# Patient Record
Sex: FEMALE | Race: OTHER | HISPANIC OR LATINO
[De-identification: names, ages, dates, MRNs, and addresses within clinical notes are randomized per-mention and may not be internally consistent; named-entity substitution may affect disease eponyms.]

---

## 2019-02-21 ENCOUNTER — RECORD ABSTRACTING (OUTPATIENT)
Age: 49
End: 2019-02-21

## 2019-02-21 DIAGNOSIS — Z83.1 FAMILY HISTORY OF OTHER INFECTIOUS AND PARASITIC DISEASES: ICD-10-CM

## 2019-02-21 DIAGNOSIS — Z78.9 OTHER SPECIFIED HEALTH STATUS: ICD-10-CM

## 2019-02-21 PROBLEM — Z00.00 ENCOUNTER FOR PREVENTIVE HEALTH EXAMINATION: Status: ACTIVE | Noted: 2019-02-21

## 2019-03-05 ENCOUNTER — APPOINTMENT (OUTPATIENT)
Dept: GASTROENTEROLOGY | Facility: CLINIC | Age: 49
End: 2019-03-05
Payer: COMMERCIAL

## 2019-03-05 VITALS
HEIGHT: 62 IN | HEART RATE: 72 BPM | SYSTOLIC BLOOD PRESSURE: 110 MMHG | BODY MASS INDEX: 24.84 KG/M2 | DIASTOLIC BLOOD PRESSURE: 80 MMHG | WEIGHT: 135 LBS

## 2019-03-05 PROCEDURE — 99204 OFFICE O/P NEW MOD 45 MIN: CPT

## 2019-03-05 RX ORDER — CHLORHEXIDINE GLUCONATE 4 %
LIQUID (ML) TOPICAL
Refills: 0 | Status: DISCONTINUED | COMMUNITY
End: 2019-03-05

## 2019-03-05 RX ORDER — OMEPRAZOLE 40 MG/1
40 CAPSULE, DELAYED RELEASE ORAL
Refills: 0 | Status: DISCONTINUED | COMMUNITY
End: 2019-03-05

## 2019-03-05 NOTE — HISTORY OF PRESENT ILLNESS
[FreeTextEntry1] : 48F choly, c-sx x3, presents for seen for vague lower abd pain predominantly L-sided, largely resolving over this time period, associated w/ constipation, bloating, but no fever/ wt loss.  No bleeding.  Sx improve w/ BMs.\par \par Had CT +iv 10/2018 via PMD: prominent soft tissue thickening at ICV  - developoing neoplasm at this level cannot be exlcuded, diverticulsis, R ov cyst. (Saw GYN)\par \par \par Prior eval:\par - epigastric burning '15:\par EGD 8/14/15 hiatal hernia, gastritis\par  \par -Periumbilical pain '15 \par Labs 7/2015 WBC 11 - states txd for UTI at that time. LFTs, celiac panel WNL.\par CT+ normal 10/2015\par colonoscopy 10/2015 one adenoma, diverticulosis - 5y f/u rec\par \par Soc:  + tobacco (advised to quit); no significant EtOH\par FHx: no FHx GI malignancy or IBD\par \par ROS:\par Constitutional:: no weight loss, fevers\par ENT: no deafness\par Eyes: not blind\par Neck: no LN\par Chest: no dyspnea/cough\par Cardiac: no chest pain\par Vascular: no leg swelling\par GI: no abdominal pain, nausea, vomiting, diarrhea, constipation, rectal bleeding, dysphagia, melena unless otherwise noted in HPI\par : no dysuria, dark urine\par Skin: no rashes, jaundice\par Heme: no bleeding\par \par Px: (VS noted below)\par General: NAD\par Eyes: anicteric\par Oropharynx:  clear\par Neck: no LN\par Chest: normal respiratory effort\par CVS: regular\par Abd: soft, NT, ND, +BS, no HSM\par Ext: no atrophy\par Neuro: grossly nonfocal\par

## 2019-03-05 NOTE — ASSESSMENT
[FreeTextEntry1] : 1) abdominal pain - hx functional symtoms, likely similar, improving, but w/ imaging findings, need to do colonoscopy w/ ileal eval.  Fiber supplement pending eval\par \par 2) abnormal imaging - Colonoscopy ASAP

## 2019-03-05 NOTE — CONSULT LETTER
[Dear  ___] : Dear  [unfilled], [Consult Letter:] : I had the pleasure of evaluating your patient, [unfilled]. [Please see my note below.] : Please see my note below. [FreeTextEntry1] : Thank you very much for allowing me to participate in the care of this patient.  If you have any questions, please do not hesitate to contact me.\par \par Sincerely, \par \par Oscar Price MD\par

## 2019-03-25 ENCOUNTER — APPOINTMENT (OUTPATIENT)
Dept: GASTROENTEROLOGY | Facility: HOSPITAL | Age: 49
End: 2019-03-25

## 2019-05-16 ENCOUNTER — APPOINTMENT (OUTPATIENT)
Dept: CARDIOLOGY | Facility: CLINIC | Age: 49
End: 2019-05-16
Payer: COMMERCIAL

## 2019-05-16 VITALS
HEIGHT: 62 IN | BODY MASS INDEX: 25.21 KG/M2 | HEART RATE: 102 BPM | DIASTOLIC BLOOD PRESSURE: 70 MMHG | SYSTOLIC BLOOD PRESSURE: 108 MMHG | WEIGHT: 137 LBS

## 2019-05-16 DIAGNOSIS — E78.5 HYPERLIPIDEMIA, UNSPECIFIED: ICD-10-CM

## 2019-05-16 DIAGNOSIS — K58.9 IRRITABLE BOWEL SYNDROME W/OUT DIARRHEA: ICD-10-CM

## 2019-05-16 DIAGNOSIS — R93.89 ABNORMAL FINDINGS ON DIAGNOSTIC IMAGING OF OTHER SPECIFIED BODY STRUCTURES: ICD-10-CM

## 2019-05-16 DIAGNOSIS — Z83.49 FAMILY HISTORY OF OTHER ENDOCRINE, NUTRITIONAL AND METABOLIC DISEASES: ICD-10-CM

## 2019-05-16 DIAGNOSIS — Z86.19 PERSONAL HISTORY OF OTHER INFECTIOUS AND PARASITIC DISEASES: ICD-10-CM

## 2019-05-16 PROCEDURE — 99244 OFF/OP CNSLTJ NEW/EST MOD 40: CPT | Mod: 25

## 2019-05-16 PROCEDURE — 93000 ELECTROCARDIOGRAM COMPLETE: CPT

## 2019-05-16 PROCEDURE — 99204 OFFICE O/P NEW MOD 45 MIN: CPT

## 2019-05-16 RX ORDER — ACYCLOVIR 800 MG/1
800 TABLET ORAL
Refills: 0 | Status: ACTIVE | COMMUNITY

## 2019-05-16 NOTE — HISTORY OF PRESENT ILLNESS
[FreeTextEntry1] : Ms arenas is referred by Dr Blum for chest pain . She notices a sharp mid sternal chest discomfort sometimes once a week, no radiation with walking, she denies dyspnea. She gets chronic palpitations "once in a while"  a tightness and a rapid heartbeat for a minute or  2. She denies syncope. She smokes 6-8 cigarettes daily and doesn't exercise.

## 2019-06-12 ENCOUNTER — APPOINTMENT (OUTPATIENT)
Dept: CARDIOLOGY | Facility: CLINIC | Age: 49
End: 2019-06-12
Payer: COMMERCIAL

## 2019-06-12 VITALS
SYSTOLIC BLOOD PRESSURE: 96 MMHG | WEIGHT: 138 LBS | HEART RATE: 86 BPM | BODY MASS INDEX: 25.24 KG/M2 | DIASTOLIC BLOOD PRESSURE: 65 MMHG

## 2019-06-12 PROCEDURE — 93015 CV STRESS TEST SUPVJ I&R: CPT

## 2019-06-12 PROCEDURE — 99213 OFFICE O/P EST LOW 20 MIN: CPT | Mod: 25

## 2019-06-12 NOTE — PHYSICAL EXAM
[Normal Appearance] : normal appearance [General Appearance - In No Acute Distress] : no acute distress [Auscultation Breath Sounds / Voice Sounds] : lungs were clear to auscultation bilaterally [Respiration, Rhythm And Depth] : normal respiratory rhythm and effort [Abnormal Walk] : normal gait [Edema] : no peripheral edema present [Heart Sounds] : normal S1 and S2

## 2019-06-12 NOTE — REVIEW OF SYSTEMS
[Recent Weight Loss (___ Lbs)] : no recent weight loss [Recent Weight Gain (___ Lbs)] : no recent weight gain [Shortness Of Breath] : no shortness of breath [Dyspnea on exertion] : not dyspnea during exertion [Chest Pain] : no chest pain [Lower Ext Edema] : no extremity edema [Palpitations] : no palpitations [Cough] : no cough [Easy Bleeding] : no tendency for easy bleeding [Easy Bruising] : no tendency for easy bruising

## 2019-06-12 NOTE — ASSESSMENT
[FreeTextEntry1] : Stress test today shows no evidence of exercise-induced ischemia. Good functional capacity\par \par Regular aerobic exercise discussed\par \par Smoking cessation urged\par \par has echocardiogram scheduled

## 2019-06-14 ENCOUNTER — RESULT REVIEW (OUTPATIENT)
Age: 49
End: 2019-06-14

## 2020-05-20 ENCOUNTER — APPOINTMENT (OUTPATIENT)
Dept: CARDIOLOGY | Facility: CLINIC | Age: 50
End: 2020-05-20
Payer: COMMERCIAL

## 2020-05-20 DIAGNOSIS — Z82.49 FAMILY HISTORY OF ISCHEMIC HEART DISEASE AND OTHER DISEASES OF THE CIRCULATORY SYSTEM: ICD-10-CM

## 2020-05-20 DIAGNOSIS — F17.200 NICOTINE DEPENDENCE, UNSPECIFIED, UNCOMPLICATED: ICD-10-CM

## 2020-05-20 DIAGNOSIS — R00.2 PALPITATIONS: ICD-10-CM

## 2020-05-20 DIAGNOSIS — R94.31 ABNORMAL ELECTROCARDIOGRAM [ECG] [EKG]: ICD-10-CM

## 2020-05-20 DIAGNOSIS — R73.03 PREDIABETES.: ICD-10-CM

## 2020-05-20 DIAGNOSIS — R07.89 OTHER CHEST PAIN: ICD-10-CM

## 2020-05-20 PROCEDURE — 99214 OFFICE O/P EST MOD 30 MIN: CPT | Mod: 95

## 2020-05-20 NOTE — HISTORY OF PRESENT ILLNESS
[Home] : at home, [unfilled] , at the time of the visit. [Other Location: e.g. Home (Enter Location, City,State)___] : at [unfilled] [Verbal consent obtained from patient] : the patient, [unfilled] [FreeTextEntry1] : Ms arenas was referred by Dr Blum in 2019  for chest pain . Her stress and echo were normal.\par \par She has occasional palpitations, especially with anxiety 2-3 times a month, she stops and takes a deep breathe.  She gets chest tightness in the mid and left substernal area, she attributes this to smoking, 1-2x /month, it occurs with movement. She has dyspnea on exertion, no syncope, no PND or orthopnea.\par She continues to  smoke 1/2 ppd.

## 2021-05-27 ENCOUNTER — APPOINTMENT (OUTPATIENT)
Dept: CARDIOLOGY | Facility: CLINIC | Age: 51
End: 2021-05-27

## 2022-05-13 ENCOUNTER — APPOINTMENT (OUTPATIENT)
Dept: PULMONOLOGY | Facility: CLINIC | Age: 52
End: 2022-05-13
Payer: COMMERCIAL

## 2022-05-13 ENCOUNTER — LABORATORY RESULT (OUTPATIENT)
Age: 52
End: 2022-05-13

## 2022-05-13 ENCOUNTER — RESULT REVIEW (OUTPATIENT)
Age: 52
End: 2022-05-13

## 2022-05-13 ENCOUNTER — NON-APPOINTMENT (OUTPATIENT)
Age: 52
End: 2022-05-13

## 2022-05-13 VITALS
WEIGHT: 144 LBS | HEART RATE: 94 BPM | BODY MASS INDEX: 26.5 KG/M2 | OXYGEN SATURATION: 100 % | HEIGHT: 62 IN | DIASTOLIC BLOOD PRESSURE: 60 MMHG | SYSTOLIC BLOOD PRESSURE: 100 MMHG | TEMPERATURE: 96.3 F

## 2022-05-13 LAB — EPWORTH SCORE: 17

## 2022-05-13 PROCEDURE — 99205 OFFICE O/P NEW HI 60 MIN: CPT | Mod: 25

## 2022-05-13 PROCEDURE — 94010 BREATHING CAPACITY TEST: CPT

## 2022-05-13 PROCEDURE — 99407 BEHAV CHNG SMOKING > 10 MIN: CPT

## 2022-05-13 NOTE — REASON FOR VISIT
[Initial] : an initial visit [Sleep Evaluation] : sleep evaluation [Shortness of Breath] : shortness of breath [TextBox_44] : smoking

## 2022-05-13 NOTE — HISTORY OF PRESENT ILLNESS
[Awakes Unrefreshed] : awakes unrefreshed [Awakes with Dry Mouth] : awakes with dry mouth [Awakes with Headache] : awakes with headache [Daytime Somnolence] : daytime somnolence [Difficulty Initiating Sleep] : difficulty initiating sleep [Difficulty Maintaining Sleep] : difficulty maintaining sleep [Fatigue] : fatigue [Frequent Nocturnal Awakening] : frequent nocturnal awakening [Hypersomnolence] : hypersomnolence [Nonrestorative Sleep] : nonrestorative sleep [Paresthesias] : paresthesias [Sleep Paralysis] : sleep paralysis [Snoring] : snoring [Tired while Driving] : tired while driving [Unintentional Sleep while Active] : unintentional sleep while active [Unintentional Sleep while Inactive] : unintentional sleep while inactive [Unusual Movements] : unusual movements [Unusual Sleep Behavior] : unusual sleep behavior [Vivid dreams] : vivid dreams [Lower Extremity Discomfort] : lower extremity discomfort [Irresistible urge to move legs] : irresistible urge to move legs [LE discomfort relieved by movement] : lower extremity discomfort relieved by movement [Late day/ Evening symptoms] : late day/ evening symptoms [Sleep Disturbances due to LE symptoms] : sleep disturbances due to lower extremity symptoms [Severe] : severe [Current] : current [Never] : never [TextBox_4] : I was asked to consult on this pt Dy Dr Jimenez for SOB.\par Patient is a 53 y/o HW born in [place], current 1 PPD smoker x 21 years w/ h/o pre DM,m has tried to quit smoking numerous times in the past but has not yet been successful. She finds smoking cigs to be a daily habit of hers that has been difficult to shake. She c/o bad SOB that renders her unable to go uphill/ stairs or take long walks. She reports a cough that usually happens first thing in the AM but also occurs intermittently throughout the afternoon and night. + sig reflux, nasal sx such as post-nasal drip, stuffy nose and rhinorrhea. Her mucus is currently clear, but when her allergies flare up it turns yellow/green and is accompanied by sinus congestion. She becomes allergic when she goes outdoors in the spring and she also owns 3 cats at home. She reports chest pressure especially when she lies down to the point she has to hold her shirt off her chest to give herself space to breathe. She snores loudly at night and hears complaints from her boyfriend about it. She also c/o leg cramps when she gets into bed at night and has to walk around to get them to subside. She feels like some night she is unable to find a comfortable position for her legs to rest. She describes feeling "like a 89 y/o lady" when she gets up in the morning.  [TextBox_11] : 1 [Cataplexy] : denies cataplexy [Dysesthesias] : denies dysesthesias [Hypnagogic Hallucinations] : denies hypnagogic hallucinations [Hypnopompic Hallucinations] : denies hypnopompic hallucinations [Recent  Weight Gain] : no recent weight gain [Witnessed Apneas] : no witnessed apneas [TextBox_77] : 10-11 [TextBox_79] : 8 [TextBox_81] : 62-30 [TextBox_83] : yes [TextBox_85] : 8 [TextBox_87] : 2 hours [TextBox_89] : 3-4 [ESS] : 17 [TextBox_5] : 6 [TextBox_7] : 36 [TextBox_9] : 0

## 2022-05-13 NOTE — PHYSICAL EXAM
[No Acute Distress] : no acute distress [Normal Appearance] : normal appearance [No Neck Mass] : no neck mass [Normal Rate/Rhythm] : normal rate/rhythm [Normal S1, S2] : normal s1, s2 [No Murmurs] : no murmurs [No Resp Distress] : no resp distress [No Abnormalities] : no abnormalities [Benign] : benign [Normal Gait] : normal gait [No Clubbing] : no clubbing [No Cyanosis] : no cyanosis [No Edema] : no edema [FROM] : FROM [Normal Color/ Pigmentation] : normal color/ pigmentation [No Focal Deficits] : no focal deficits [Oriented x3] : oriented x3 [Normal Affect] : normal affect [Low Lying Soft Palate] : low lying soft palate [IV] : Mallampati Class: IV [TextBox_11] : inflamed boggy nasal turbinates, glossomegaly  [TextBox_68] : globally diminished BS, + inspiratory wheeze R base, scattered rhonchi

## 2022-05-13 NOTE — CONSULT LETTER
[___] : [unfilled] [FreeTextEntry1] : Thank you for allowing me to consult on PRATIK ASHER  for  SOB/ snoring.  Please see my note below.\par \par \par \par  [FreeTextEntry3] : Thank you very much for allowing me to consult on your patient.  If you have any questions, please do not hesitate to contact me.\par \par Sincerely,\par \par Donis Joshua MD\par Pulmonary and Sleep Medicine\par [facility]\par

## 2022-05-13 NOTE — REVIEW OF SYSTEMS
[Fatigue] : fatigue [EDS] : EDS [Sore Throat] : sore throat [Dry Mouth] : dry mouth [Chest Tightness] : chest tightness [Wheezing] : wheezing [A.M. Dry Mouth] : a.m. dry mouth [SOB on Exertion] : sob on exertion [Leg Cramps] : leg cramps [Orthopnea] : orthopnea [GERD] : gerd [Arthralgias] : arthralgias [Negative] : Endocrine

## 2022-05-13 NOTE — DISCUSSION/SUMMARY
[FreeTextEntry1] : [place] 5/13/2022: FEV1 2.53 (99% pred), FEV1/FVC 83 \par CT Abd 4/22/2021: Thickened GE junction. Mild emphysematous pattern seen at the base. \par

## 2022-05-16 LAB
25(OH)D3 SERPL-MCNC: 30.2 NG/ML
ALBUMIN SERPL ELPH-MCNC: 4.6 G/DL
ALP BLD-CCNC: 96 U/L
ALT SERPL-CCNC: 18 U/L
ANION GAP SERPL CALC-SCNC: 11 MMOL/L
AST SERPL-CCNC: 16 U/L
BASOPHILS # BLD AUTO: 0.07 K/UL
BASOPHILS NFR BLD AUTO: 0.7 %
BILIRUB SERPL-MCNC: 0.2 MG/DL
BUN SERPL-MCNC: 12 MG/DL
CALCIUM SERPL-MCNC: 9.4 MG/DL
CHLORIDE SERPL-SCNC: 103 MMOL/L
CO2 SERPL-SCNC: 23 MMOL/L
CREAT SERPL-MCNC: 0.81 MG/DL
CRP SERPL-MCNC: <3 MG/L
EGFR: 88 ML/MIN/1.73M2
EOSINOPHIL # BLD AUTO: 0.2 K/UL
EOSINOPHIL NFR BLD AUTO: 1.9 %
ERYTHROCYTE [SEDIMENTATION RATE] IN BLOOD BY WESTERGREN METHOD: 14 MM/HR
FERRITIN SERPL-MCNC: 235 NG/ML
GLUCOSE SERPL-MCNC: 102 MG/DL
HCT VFR BLD CALC: 45.7 %
HGB BLD-MCNC: 14.2 G/DL
IMM GRANULOCYTES NFR BLD AUTO: 0.4 %
IRON SATN MFR SERPL: 23 %
IRON SERPL-MCNC: 81 UG/DL
LYMPHOCYTES # BLD AUTO: 2.81 K/UL
LYMPHOCYTES NFR BLD AUTO: 26.8 %
MAGNESIUM SERPL-MCNC: 2.1 MG/DL
MAN DIFF?: NORMAL
MCHC RBC-ENTMCNC: 30 PG
MCHC RBC-ENTMCNC: 31.1 GM/DL
MCV RBC AUTO: 96.4 FL
MONOCYTES # BLD AUTO: 0.73 K/UL
MONOCYTES NFR BLD AUTO: 7 %
NEUTROPHILS # BLD AUTO: 6.62 K/UL
NEUTROPHILS NFR BLD AUTO: 63.2 %
PLATELET # BLD AUTO: 334 K/UL
POTASSIUM SERPL-SCNC: 4.8 MMOL/L
PROT SERPL-MCNC: 7.3 G/DL
RBC # BLD: 4.74 M/UL
RBC # FLD: 13.3 %
SODIUM SERPL-SCNC: 138 MMOL/L
TIBC SERPL-MCNC: 347 UG/DL
UIBC SERPL-MCNC: 266 UG/DL
WBC # FLD AUTO: 10.47 K/UL

## 2022-05-20 LAB
A ALTERNATA IGE QN: <0.1 KUA/L
A ALTERNATA IGE QN: <0.1 KUA/L
A FUMIGATUS IGE QN: <0.1 KUA/L
A FUMIGATUS IGE QN: <0.1 KUA/L
BERMUDA GRASS IGE QN: <0.1 KUA/L
BOXELDER IGE QN: <0.1 KUA/L
C HERBARUM IGE QN: <0.1 KUA/L
C HERBARUM IGE QN: <0.1 KUA/L
CALIF WALNUT IGE QN: <0.1 KUA/L
CAT (RFEL D) 1 IGE QN: <0.1 KUA/L
CAT (RFEL D) 4 IGE QN: <0.1 KUA/L
CAT (RFEL D) 7 IGE QN: <0.1 KUA/L
CAT DANDER IGE QN: <0.1 KUA/L
CAT SERUM ALB IGE QN: <0.1 KUA/L
CMN PIGWEED IGE QN: <0.1 KUA/L
COMMON RAGWEED IGE QN: <0.1 KUA/L
COTTONWOOD IGE QN: <0.1 KUA/L
D FARINAE IGE QN: <0.1 KUA/L
D PTERONYSS IGE QN: <0.1 KUA/L
DEPRECATED A ALTERNATA IGE RAST QL: 0
DEPRECATED A ALTERNATA IGE RAST QL: 0
DEPRECATED A FUMIGATUS IGE RAST QL: 0
DEPRECATED A FUMIGATUS IGE RAST QL: 0
DEPRECATED BERMUDA GRASS IGE RAST QL: 0
DEPRECATED BOXELDER IGE RAST QL: 0
DEPRECATED C HERBARUM IGE RAST QL: 0
DEPRECATED C HERBARUM IGE RAST QL: 0
DEPRECATED CAT (RFEL D) 1 IGE RAST QL: 0
DEPRECATED CAT (RFEL D) 4 IGE RAST QL: 0
DEPRECATED CAT (RFEL D) 7 IGE RAST QL: 0
DEPRECATED CAT DANDER IGE RAST QL: 0
DEPRECATED CAT SERUM ALB IGE RAST QL: 0
DEPRECATED COMMON PIGWEED IGE RAST QL: 0
DEPRECATED COMMON RAGWEED IGE RAST QL: 0
DEPRECATED COTTONWOOD IGE RAST QL: 0
DEPRECATED D FARINAE IGE RAST QL: 0
DEPRECATED D PTERONYSS IGE RAST QL: 0
DEPRECATED DOG (RCAN F) 1 IGE RAST QL: 0
DEPRECATED DOG (RCAN F) 2 IGE RAST QL: 0
DEPRECATED DOG (RCAN F) 4 IGE RAST QL: 0
DEPRECATED DOG (RCAN F) 5 IGE RAST QL: 0
DEPRECATED DOG (RCAN F) 6 IGE RAST QL: 0
DEPRECATED DOG DANDER IGE RAST QL: 0
DEPRECATED DOG SERUM ALB IGE RAST QL: 0
DEPRECATED GOOSEFOOT IGE RAST QL: 0
DEPRECATED LONDON PLANE IGE RAST QL: 0
DEPRECATED MOUSE URINE PROT IGE RAST QL: 0
DEPRECATED MUGWORT IGE RAST QL: 0
DEPRECATED P NOTATUM IGE RAST QL: 0
DEPRECATED P NOTATUM IGE RAST QL: 0
DEPRECATED RED CEDAR IGE RAST QL: 0
DEPRECATED ROACH IGE RAST QL: 0
DEPRECATED S ROSTRATA IGE RAST QL: 0
DEPRECATED SHEEP SORREL IGE RAST QL: 0
DEPRECATED SILVER BIRCH IGE RAST QL: 0
DEPRECATED TIMOTHY IGE RAST QL: 0
DEPRECATED WHITE ASH IGE RAST QL: 0
DEPRECATED WHITE OAK IGE RAST QL: 0
DOG (RCAN F) 1 IGE QN: <0.1 KUA/L
DOG (RCAN F) 2 IGE QN: <0.1 KUA/L
DOG (RCAN F) 4 IGE QN: <0.1 KUA/L
DOG (RCAN F) 5 IGE QN: <0.1 KUA/L
DOG (RCAN F) 6 IGE QN: <0.1 KUA/L
DOG DANDER IGE QN: <0.1 KUA/L
DOG SERUM ALB IGE QN: <0.1 KUA/L
GOOSEFOOT IGE QN: <0.1 KUA/L
HORSE (REQU C) 1 IGE QN: 0
HORSE REQU C 1 IGE E227 CONC: <0.1 KUA/L
LONDON PLANE IGE QN: <0.1 KUA/L
MOUSE URINE PROT IGE QN: <0.1 KUA/L
MUGWORT IGE QN: <0.1 KUA/L
MULBERRY (T70) CLASS: 0
MULBERRY (T70) CONC: <0.1 KUA/L
P NOTATUM IGE QN: <0.1 KUA/L
P NOTATUM IGE QN: <0.1 KUA/L
RED CEDAR IGE QN: <0.1 KUA/L
ROACH IGE QN: <0.1 KUA/L
S ROSTRATA IGE QN: <0.1 KUA/L
SHEEP SORREL IGE QN: <0.1 KUA/L
SILVER BIRCH IGE QN: <0.1 KUA/L
TIMOTHY IGE QN: <0.1 KUA/L
TOTAL IGE SMQN RAST: 7 KU/L
TREE ALLERG MIX1 IGE QL: 0
WHITE ASH IGE QN: <0.1 KUA/L
WHITE ELM IGE QN: 0
WHITE ELM IGE QN: <0.1 KUA/L
WHITE OAK IGE QN: <0.1 KUA/L

## 2022-07-13 ENCOUNTER — NON-APPOINTMENT (OUTPATIENT)
Age: 52
End: 2022-07-13

## 2022-07-13 ENCOUNTER — APPOINTMENT (OUTPATIENT)
Dept: PULMONOLOGY | Facility: CLINIC | Age: 52
End: 2022-07-13

## 2022-07-13 VITALS
TEMPERATURE: 95.3 F | HEIGHT: 62 IN | DIASTOLIC BLOOD PRESSURE: 60 MMHG | SYSTOLIC BLOOD PRESSURE: 100 MMHG | BODY MASS INDEX: 26.5 KG/M2 | OXYGEN SATURATION: 97 % | WEIGHT: 144 LBS | HEART RATE: 81 BPM

## 2022-07-13 PROCEDURE — 99214 OFFICE O/P EST MOD 30 MIN: CPT | Mod: 25

## 2022-07-13 PROCEDURE — 99407 BEHAV CHNG SMOKING > 10 MIN: CPT

## 2022-07-13 PROCEDURE — 94010 BREATHING CAPACITY TEST: CPT

## 2022-07-13 RX ORDER — ACYCLOVIR 400 MG/1
400 TABLET ORAL
Qty: 60 | Refills: 0 | Status: ACTIVE | COMMUNITY
Start: 2022-04-06

## 2022-07-13 RX ORDER — VARENICLINE 0.5 MG/1
0.5 TABLET, FILM COATED ORAL
Qty: 11 | Refills: 0 | Status: ACTIVE | COMMUNITY
Start: 2022-05-23

## 2022-07-13 RX ORDER — VARENICLINE 1 MG/1
1 TABLET, FILM COATED ORAL
Qty: 60 | Refills: 0 | Status: ACTIVE | COMMUNITY
Start: 2022-05-23

## 2022-07-14 NOTE — DISCUSSION/SUMMARY
[FreeTextEntry1] : [place] 7/13/2022 FEV1 2.49 (98% predicted) FEV1/FVC- 81\par [facility] 5/22/2022: AHI=4, lowest saturation 86%\par 6MWT 5/18/2022: No sig desat but + increased HR response.\par PFTs 05/18/2022 Actual FEV1 2.29 (FEV1 Pred 89%), FEV1/FVC(%) - 84, RV 85%, TLC 89%, RV/TLC 96%, DLCO 72% \par [place] 5/13/2022: FEV1 2.53 (99% pred), FEV1/FVC 83 \par CXR 5/13/2022: Mild hyperinflation otherwise NAD.\par CT Abd 4/22/2021: Thickened GE junction. Mild emphysematous pattern seen at the base. \par

## 2022-07-14 NOTE — COUNSELING
[Cessation strategies including cessation program discussed] : Cessation strategies including cessation program discussed [Use of nicotine replacement therapies and other medications discussed] : Use of nicotine replacement therapies and other medications discussed [Encouraged to pick a quit date and identify support needed to quit] : Encouraged to pick a quit date and identify support needed to quit [Yes] : Willing to quit smoking [FreeTextEntry3] : 16

## 2022-07-14 NOTE — HISTORY OF PRESENT ILLNESS
[Current] : current [Never] : never [Awakes Unrefreshed] : awakes unrefreshed [Awakes with Dry Mouth] : awakes with dry mouth [Awakes with Headache] : awakes with headache [Daytime Somnolence] : daytime somnolence [Difficulty Initiating Sleep] : difficulty initiating sleep [Difficulty Maintaining Sleep] : difficulty maintaining sleep [Fatigue] : fatigue [Frequent Nocturnal Awakening] : frequent nocturnal awakening [Hypersomnolence] : hypersomnolence [Nonrestorative Sleep] : nonrestorative sleep [Paresthesias] : paresthesias [Sleep Paralysis] : sleep paralysis [Snoring] : snoring [Tired while Driving] : tired while driving [Unintentional Sleep while Active] : unintentional sleep while active [Unintentional Sleep while Inactive] : unintentional sleep while inactive [Unusual Movements] : unusual movements [Unusual Sleep Behavior] : unusual sleep behavior [Vivid dreams] : vivid dreams [Lower Extremity Discomfort] : lower extremity discomfort [Irresistible urge to move legs] : irresistible urge to move legs [LE discomfort relieved by movement] : lower extremity discomfort relieved by movement [Late day/ Evening symptoms] : late day/ evening symptoms [Sleep Disturbances due to LE symptoms] : sleep disturbances due to lower extremity symptoms [Severe] : severe [TextBox_4] : Patient returns on a f/u for asthma w/ COPD. She is doing better w/ the cigarettes and is down to 3-4/day on generic Chantix (varenicline). She reports occasional cough w/ wheeze and chest tightness. She says her nasal passages are good. She reports having barely slept during her HST. She still has issues sleeping and gets tired during the day including while she is driving home. Her weight has been up and down but is currently the same as it was during her last visit in May.\par  [TextBox_11] : 1 [Cataplexy] : denies cataplexy [Dysesthesias] : denies dysesthesias [Hypnagogic Hallucinations] : denies hypnagogic hallucinations [Hypnopompic Hallucinations] : denies hypnopompic hallucinations [Recent  Weight Gain] : no recent weight gain [Witnessed Apneas] : no witnessed apneas [TextBox_77] : 10-11 [TextBox_79] : 8 [TextBox_81] : 28-30 [TextBox_83] : yes [TextBox_85] : 8 [TextBox_87] : 2 hours [TextBox_89] : 3-4 [ESS] : 17 [TextBox_5] : 6 [TextBox_7] : 36 [TextBox_9] : 0

## 2022-07-14 NOTE — PHYSICAL EXAM
[No Acute Distress] : no acute distress [Low Lying Soft Palate] : low lying soft palate [IV] : Mallampati Class: IV [Normal Appearance] : normal appearance [No Neck Mass] : no neck mass [Normal Rate/Rhythm] : normal rate/rhythm [Normal S1, S2] : normal s1, s2 [No Murmurs] : no murmurs [No Resp Distress] : no resp distress [No Abnormalities] : no abnormalities [Normal Gait] : normal gait [No Clubbing] : no clubbing [No Cyanosis] : no cyanosis [No Edema] : no edema [FROM] : FROM [Normal Color/ Pigmentation] : normal color/ pigmentation [No Focal Deficits] : no focal deficits [Oriented x3] : oriented x3 [Normal Affect] : normal affect [Clear to Auscultation Bilaterally] : clear to auscultation bilaterally [TextBox_11] : inflamed nasal mucosa, crowded o/p, glossomegaly, cobblestoning [TextBox_89] : + ventral hernia

## 2022-07-14 NOTE — CONSULT LETTER
[___] : [unfilled] [FreeTextEntry1] : Thank you for allowing me to consult on PRATIK ASHER  for  SOB/ snoring.  Please see my note below.\par \par \par \par  [FreeTextEntry3] : Thank you very much for allowing me to consult on your patient.  If you have any questions, please do not hesitate to contact me.\par \par Sincerely,\par \par Donis Joshua MD\par Pulmonary and Sleep Medicine\par [facility]\par

## 2022-08-10 ENCOUNTER — NON-APPOINTMENT (OUTPATIENT)
Age: 52
End: 2022-08-10

## 2022-08-10 ENCOUNTER — APPOINTMENT (OUTPATIENT)
Dept: SURGERY | Facility: CLINIC | Age: 52
End: 2022-08-10

## 2022-08-10 VITALS
HEIGHT: 62 IN | HEART RATE: 76 BPM | DIASTOLIC BLOOD PRESSURE: 79 MMHG | OXYGEN SATURATION: 97 % | WEIGHT: 145.6 LBS | BODY MASS INDEX: 26.79 KG/M2 | SYSTOLIC BLOOD PRESSURE: 116 MMHG

## 2022-08-10 DIAGNOSIS — K43.0 INCISIONAL HERNIA WITH OBSTRUCTION, W/OUT GANGRENE: ICD-10-CM

## 2022-08-10 PROCEDURE — 99213 OFFICE O/P EST LOW 20 MIN: CPT

## 2022-08-10 RX ORDER — FAMOTIDINE 20 MG/1
20 TABLET, FILM COATED ORAL
Qty: 20 | Refills: 0 | Status: ACTIVE | COMMUNITY
Start: 2022-08-05

## 2022-08-10 NOTE — PLAN
[FreeTextEntry1] : Is a 52-year-old female who is here for follow-up 2 years after a component separation hernia repair done with a 15 cm ProGrip mesh as well as phasic's.  Her hernia was from a supraumbilical cholecystectomy site done elsewhere.  2 months ago she developed discomfort above her umbilicus and she had a CAT scan done by her primary care physician showing a hernia with a 3 cm defect approximately 4 inches above the umbilicus.  She sent here for evaluation.\par \par On exam she has a hernia located approximately 2 to 3 cm above the previous scar.  It is partially reducible.  It is appreciated in the standing and supine position.\par \par Plan: At the original surgery the patient had 3 hernias noted that were combined into 1.  Either she has a recurrence of one of the upper defects or perhaps there was a defect that was missed at the time of the original surgery above the current incision.  I have offered the patient repair again with mesh.  She states that this summer is not a good time for her and that she may come back in October currently the hernia is containing incarcerated omentum.  I explained to her the chance of worsening symptoms and less likely incarceration with bowel.  She understands and when she is ready for surgical repair she will return for scheduled.  She discussed the possibility of plastic surgery at the time of this surgery but likely does not wish to pursue that option.

## 2022-09-06 ENCOUNTER — APPOINTMENT (OUTPATIENT)
Dept: SURGERY | Facility: CLINIC | Age: 52
End: 2022-09-06

## 2022-09-06 VITALS
HEART RATE: 84 BPM | TEMPERATURE: 98.4 F | OXYGEN SATURATION: 99 % | SYSTOLIC BLOOD PRESSURE: 126 MMHG | DIASTOLIC BLOOD PRESSURE: 78 MMHG

## 2022-09-06 PROCEDURE — 99213 OFFICE O/P EST LOW 20 MIN: CPT

## 2022-09-06 NOTE — ASSESSMENT
[FreeTextEntry1] : Patient presents for second opinion regarding her abdominal wall hernia.  She is status post open repair of complex chronically incarcerated ventral hernia x3 2 years ago.  She states she notices protrusion in her upper abdomen and subsequently saw the primary surgeon.  A CAT scan was ordered by her primary care physician and the CT demonstrated an increase in the size of the fat-containing anterior midline abdominal wall hernia that is 2-1/2 cm above the umbilicus and the defect is 3 cm in width and the sac is over 5 cm.  Patient now wish to have this hernia repaired.\par \par He has no complaints of nausea or vomiting no change in bowel habits.\par \par Physical examination patient examined erect and supine position.  The abdomen is slightly obese it is soft nontender and nondistended.  She has a mismatch abdominal wall contour she is slightly larger on the left lower quadrant than that of the right lower quadrant.  She has a slight protrusion in the upper aspect of the scar.  On examination there is no obvious defect appreciated on exam.  Findings on exam today are consistent with that of a diastases or a small hernia.  There is no discrete mass.  She has a mild upper abdominal wall diastases from xiphoid to umbilicus.\par \par Impression/plan: This is a 52-year-old female who underwent an open ventral hernia repair with mesh approximately 2 years ago.  She now presents with an slightly protrusion in the upper aspect of her scar.  A CT demonstrated a slight increase in the fat-containing anterior midline hernia.  Based on the CT it is apparent this patient has a recurrent incisional hernia it is noted to be approximately 2-1/2 cm above the umbilicus with a sac defect of over 5 cm.  The physical examination today is not consistent with the CAT scan reading or findings of a sac being over 5 cm.  The physical exam today is consistent with a slight protrusion in the upper aspect of the scar which may be related to a recurrent hernia or diastases.  Based on the CAT scan reading it is clear that she has a hernia.\par \par Patient myself had a long conversation regarding the various surgical approaches with and without mesh.  At this point the patient wishes to have her abdominal contour straightened out and therefore she will see a plastic surgeon for a concomitant abdominoplasty and repair of diastases and at that time I will repair the recurrent hernia with mesh and most likely a component separation as needed.\par \par The indications alternatives and complication of the procedure discussed questions answered.  Consent was obtained in the office today.

## 2022-09-14 ENCOUNTER — NON-APPOINTMENT (OUTPATIENT)
Age: 52
End: 2022-09-14

## 2022-09-14 ENCOUNTER — APPOINTMENT (OUTPATIENT)
Dept: PULMONOLOGY | Facility: CLINIC | Age: 52
End: 2022-09-14

## 2022-09-14 VITALS
HEART RATE: 78 BPM | HEIGHT: 62 IN | SYSTOLIC BLOOD PRESSURE: 82 MMHG | TEMPERATURE: 95.5 F | OXYGEN SATURATION: 96 % | WEIGHT: 143 LBS | BODY MASS INDEX: 26.31 KG/M2 | DIASTOLIC BLOOD PRESSURE: 46 MMHG

## 2022-09-14 DIAGNOSIS — F17.200 NICOTINE DEPENDENCE, UNSPECIFIED, UNCOMPLICATED: ICD-10-CM

## 2022-09-14 DIAGNOSIS — K21.9 GASTRO-ESOPHAGEAL REFLUX DISEASE W/OUT ESOPHAGITIS: ICD-10-CM

## 2022-09-14 DIAGNOSIS — J32.1 CHRONIC FRONTAL SINUSITIS: ICD-10-CM

## 2022-09-14 DIAGNOSIS — R09.81 NASAL CONGESTION: ICD-10-CM

## 2022-09-14 DIAGNOSIS — E61.1 IRON DEFICIENCY: ICD-10-CM

## 2022-09-14 DIAGNOSIS — J30.89 OTHER ALLERGIC RHINITIS: ICD-10-CM

## 2022-09-14 DIAGNOSIS — E66.3 OVERWEIGHT: ICD-10-CM

## 2022-09-14 PROCEDURE — 99215 OFFICE O/P EST HI 40 MIN: CPT | Mod: 25

## 2022-09-14 PROCEDURE — 99407 BEHAV CHNG SMOKING > 10 MIN: CPT

## 2022-09-14 PROCEDURE — 94010 BREATHING CAPACITY TEST: CPT

## 2022-09-14 NOTE — CONSULT LETTER
[___] : [unfilled] [FreeTextEntry1] : Thank you for allowing me to consult on PRATIK ASHER  for  SOB/ snoring.  Please see my note below.\par \par \par \par  [FreeTextEntry3] : Thank you very much for allowing me to consult on your patient.  If you have any questions, please do not hesitate to contact me.\par \par Sincerely,\par \par Donis Joshua MD\par Pulmonary and Sleep Medicine\par [facility]\par

## 2022-09-14 NOTE — REVIEW OF SYSTEMS
[Sinus Problems] : sinus problems [Cough] : cough [Chest Tightness] : chest tightness [Wheezing] : wheezing [A.M. Dry Mouth] : a.m. dry mouth [SOB on Exertion] : sob on exertion [Seasonal Allergies] : seasonal allergies [Negative] : Endocrine

## 2022-09-14 NOTE — ASSESSMENT
[FreeTextEntry1] : above was discussed at length with the patient, who has an excellent understanding of the issues.

## 2022-09-14 NOTE — DISCUSSION/SUMMARY
[FreeTextEntry1] : [place] 8/26/2022: AHI=1, lowest desaturation 90%\par [place] 7/13/2022 FEV1 2.49 (98% predicted) FEV1/FVC- 81\par HST 5/22/2022: AHI=4, lowest saturation 86%\par 6MWT 5/18/2022: No sig desat but + increased HR response.\par PFTs 05/18/2022 Actual FEV1 2.29 (FEV1 Pred 89%), FEV1/FVC(%) - 84, RV 85%, TLC 89%, RV/TLC 96%, DLCO 72% \par [place] 5/13/2022: FEV1 2.53 (99% pred), FEV1/FVC 83 \par Labs 5/13/2022: Wbc 10.47, Hgb 14.2, Hct 45.7, Plt 334, E 1.9%, Glu 102 otherwise Cmp WNL, Mg 2.1, Fe labs WNL, Vit D 30.2, German 235, Crp <3, Esr 14\par Allergy panel negative.\par CXR 5/13/2022: Mild hyperinflation otherwise NAD.\par CT Abd 4/22/2021: Thickened GE junction. Mild emphysematous pattern seen at the base. \par

## 2022-09-14 NOTE — HISTORY OF PRESENT ILLNESS
[Current] : current [Never] : never [Awakes Unrefreshed] : awakes unrefreshed [Awakes with Dry Mouth] : awakes with dry mouth [Awakes with Headache] : awakes with headache [Daytime Somnolence] : daytime somnolence [Difficulty Initiating Sleep] : difficulty initiating sleep [Difficulty Maintaining Sleep] : difficulty maintaining sleep [Fatigue] : fatigue [Frequent Nocturnal Awakening] : frequent nocturnal awakening [Hypersomnolence] : hypersomnolence [Nonrestorative Sleep] : nonrestorative sleep [Paresthesias] : paresthesias [Sleep Paralysis] : sleep paralysis [Snoring] : snoring [Tired while Driving] : tired while driving [Unintentional Sleep while Active] : unintentional sleep while active [Unintentional Sleep while Inactive] : unintentional sleep while inactive [Unusual Movements] : unusual movements [Unusual Sleep Behavior] : unusual sleep behavior [Vivid dreams] : vivid dreams [Lower Extremity Discomfort] : lower extremity discomfort [Irresistible urge to move legs] : irresistible urge to move legs [LE discomfort relieved by movement] : lower extremity discomfort relieved by movement [Late day/ Evening symptoms] : late day/ evening symptoms [Sleep Disturbances due to LE symptoms] : sleep disturbances due to lower extremity symptoms [Severe] : severe [TextBox_4] : Patient returns on a f/u for asthma w/ COPD. She is smoking a lot less now on Chantix and plans to begin taking Commit lozenges tomorrow. She c/o cough for the past 2 weeks and just started taking allergy medications 2 days ago. She takes her allergy medication at nighttime so that she can "sleep it off." Her cough occurs mostly at nighttime and she reports accompanying hoarseness. She does not cough up any phlegm. She finds her nasal passages to be clear and denies post-nasal drip. She has not been using nasal saline because she feels clear but plans to use the saline more often starting today. She thinks that BREO 100 helped but it "felt weird" and she would prefer to take a pill rather than use an inhaler. She had her sleep study done in August which found no significant evidence of MONY (AHI=1). She still c/o feeling tired and is unsure what to do next. She came home from work yesterday and went straight to bed because of how tired she was. She reports increased SOB on the days she is the most tired and "feels like an old lady." She c/o leg cramps that also occur especially at nighttime. The cramping occurs mostly in her calf, but she also notes numbness and heaviness that she feels throughout both legs. She feels the heaviness during the day as well and can't sit down for long periods of time w/o moving because her legs start to become numb. She is planning to have surgery w/ Dr. Castrejon to correct her recurrent abdominal hernia.  \par  [TextBox_11] : 1 [Cataplexy] : denies cataplexy [Dysesthesias] : denies dysesthesias [Hypnagogic Hallucinations] : denies hypnagogic hallucinations [Hypnopompic Hallucinations] : denies hypnopompic hallucinations [Recent  Weight Gain] : no recent weight gain [Witnessed Apneas] : no witnessed apneas [TextBox_77] : 10-11 [TextBox_79] : 8 [TextBox_81] : 80-30 [TextBox_83] : yes [TextBox_85] : 8 [TextBox_87] : 2 hours [TextBox_89] : 3-4 [ESS] : 17 [TextBox_5] : 6 [TextBox_7] : 36 [TextBox_9] : 0

## 2022-10-06 ENCOUNTER — RX CHANGE (OUTPATIENT)
Age: 52
End: 2022-10-06

## 2022-10-06 RX ORDER — MONTELUKAST 10 MG/1
10 TABLET, FILM COATED ORAL
Qty: 90 | Refills: 2 | Status: ACTIVE | COMMUNITY
Start: 2022-10-06 | End: 1900-01-01

## 2022-10-06 RX ORDER — MONTELUKAST 10 MG/1
10 TABLET, FILM COATED ORAL
Qty: 30 | Refills: 5 | Status: DISCONTINUED | COMMUNITY
Start: 2022-09-14 | End: 2022-10-06

## 2022-11-16 ENCOUNTER — NON-APPOINTMENT (OUTPATIENT)
Age: 52
End: 2022-11-16

## 2022-11-16 ENCOUNTER — APPOINTMENT (OUTPATIENT)
Dept: PULMONOLOGY | Facility: CLINIC | Age: 52
End: 2022-11-16

## 2022-11-16 VITALS
TEMPERATURE: 98.6 F | WEIGHT: 142 LBS | DIASTOLIC BLOOD PRESSURE: 60 MMHG | SYSTOLIC BLOOD PRESSURE: 94 MMHG | HEART RATE: 75 BPM | OXYGEN SATURATION: 98 % | BODY MASS INDEX: 26.13 KG/M2 | HEIGHT: 62 IN

## 2022-11-16 DIAGNOSIS — G25.81 RESTLESS LEGS SYNDROME: ICD-10-CM

## 2022-11-16 DIAGNOSIS — R06.83 SNORING: ICD-10-CM

## 2022-11-16 DIAGNOSIS — J44.9 CHRONIC OBSTRUCTIVE PULMONARY DISEASE, UNSPECIFIED: ICD-10-CM

## 2022-11-16 DIAGNOSIS — Z01.811 ENCOUNTER FOR PREPROCEDURAL RESPIRATORY EXAMINATION: ICD-10-CM

## 2022-11-16 DIAGNOSIS — F17.219 NICOTINE DEPENDENCE, CIGARETTES, WITH UNSPECIFIED NICOTINE-INDUCED DISORDERS: ICD-10-CM

## 2022-11-16 PROCEDURE — 99215 OFFICE O/P EST HI 40 MIN: CPT | Mod: 25

## 2022-11-16 PROCEDURE — 94010 BREATHING CAPACITY TEST: CPT

## 2022-11-16 RX ORDER — CHLORHEXIDINE GLUCONATE, 0.12% ORAL RINSE 1.2 MG/ML
0.12 SOLUTION DENTAL
Qty: 473 | Refills: 0 | Status: ACTIVE | COMMUNITY
Start: 2022-10-10

## 2022-11-16 RX ORDER — IPRATROPIUM BROMIDE 17 UG/1
17 AEROSOL, METERED RESPIRATORY (INHALATION)
Qty: 1 | Refills: 5 | Status: ACTIVE | COMMUNITY
Start: 2022-11-16 | End: 1900-01-01

## 2022-11-16 RX ORDER — HYDROCODONE BITARTRATE AND ACETAMINOPHEN 5; 325 MG/1; MG/1
5-325 TABLET ORAL
Qty: 8 | Refills: 0 | Status: ACTIVE | COMMUNITY
Start: 2022-10-10

## 2022-11-16 RX ORDER — NORETHINDRONE 0.35 MG/1
0.35 TABLET ORAL
Qty: 28 | Refills: 0 | Status: ACTIVE | COMMUNITY
Start: 2022-11-12

## 2022-11-16 RX ORDER — AMOXICILLIN 500 MG/1
500 CAPSULE ORAL
Qty: 21 | Refills: 0 | Status: ACTIVE | COMMUNITY
Start: 2022-10-10

## 2022-11-16 RX ORDER — NIRMATRELVIR AND RITONAVIR 300-100 MG
20 X 150 MG & KIT ORAL
Qty: 30 | Refills: 0 | Status: ACTIVE | COMMUNITY
Start: 2022-09-28

## 2022-11-18 ENCOUNTER — RESULT REVIEW (OUTPATIENT)
Age: 52
End: 2022-11-18

## 2022-11-21 ENCOUNTER — TRANSCRIPTION ENCOUNTER (OUTPATIENT)
Age: 52
End: 2022-11-21

## 2022-11-21 ENCOUNTER — RESULT REVIEW (OUTPATIENT)
Age: 52
End: 2022-11-21

## 2022-11-21 ENCOUNTER — APPOINTMENT (OUTPATIENT)
Dept: SURGERY | Facility: HOSPITAL | Age: 52
End: 2022-11-21

## 2022-12-20 ENCOUNTER — APPOINTMENT (OUTPATIENT)
Dept: SURGERY | Facility: CLINIC | Age: 52
End: 2022-12-20

## 2022-12-20 VITALS
OXYGEN SATURATION: 99 % | DIASTOLIC BLOOD PRESSURE: 81 MMHG | TEMPERATURE: 98 F | SYSTOLIC BLOOD PRESSURE: 113 MMHG | HEART RATE: 85 BPM

## 2022-12-20 DIAGNOSIS — K43.6 OTHER AND UNSPECIFIED VENTRAL HERNIA WITH OBSTRUCTION, W/OUT GANGRENE: ICD-10-CM

## 2022-12-20 PROCEDURE — 99024 POSTOP FOLLOW-UP VISIT: CPT

## 2022-12-20 NOTE — ASSESSMENT
[FreeTextEntry1] : Patient presents today for follow-up after open repair of complex incarcerated ventral hernia, concomitant about abdominoplasty.  Patient without complaints at this time and happy with results both cosmetically and physically.\par \par She has had no bouts of nausea or vomiting or change in bowel habits.  No fever chills or sweats.\par \par Physical examination patient examined erect and supine position.  She has very well-healed abdominoplasty scar she has no evidence recurrence.\par \par Status post abdominoplasty repair complex chronically incarcerated ventral hernia patient doing well surgically no acute findings continue plastics follow-up follow-up with me on a as needed basis

## 2022-12-23 PROBLEM — Z01.811 PREOP PULMONARY/RESPIRATORY EXAM: Status: ACTIVE | Noted: 2022-09-14

## 2022-12-23 PROBLEM — R06.83 SNORING: Status: ACTIVE | Noted: 2022-05-13

## 2022-12-23 PROBLEM — F17.219 CIGARETTE NICOTINE DEPENDENCE WITH NICOTINE-INDUCED DISORDER: Status: ACTIVE | Noted: 2022-05-13

## 2022-12-23 PROBLEM — J44.9 ASTHMA WITH COPD: Status: ACTIVE | Noted: 2022-05-13

## 2022-12-23 PROBLEM — G25.81 RESTLESS LEGS SYNDROME (RLS): Status: ACTIVE | Noted: 2022-05-13

## 2022-12-23 NOTE — CONSULT LETTER
[___] : [unfilled] [FreeTextEntry1] : Thank you for allowing me to consult on PRATIK ASHER  for  SOB/ snoring.  Please see my note below.\par \par \par \par  [FreeTextEntry3] : Thank you very much for allowing me to consult on your patient.  If you have any questions, please do not hesitate to contact me.\par \par Sincerely,\par \par Donis Joshua MD\par Pulmonary and Sleep Medicine\par [facility]\par

## 2022-12-23 NOTE — ASSESSMENT
[FreeTextEntry1] : above was discussed at length with the patient, who has an excellent understanding of the issues. I informed the patient that this may be our last visit as I am leaving the practice at the end of December. \par

## 2022-12-23 NOTE — PHYSICAL EXAM
[No Acute Distress] : no acute distress [Low Lying Soft Palate] : low lying soft palate [Turbinate hypertrophy] : turbinate hypertrophy [III] : Mallampati Class: III [Normal Appearance] : normal appearance [No Neck Mass] : no neck mass [Normal Rate/Rhythm] : normal rate/rhythm [Normal S1, S2] : normal s1, s2 [No Murmurs] : no murmurs [No Resp Distress] : no resp distress [No Abnormalities] : no abnormalities [Normal Gait] : normal gait [No Clubbing] : no clubbing [No Cyanosis] : no cyanosis [No Edema] : no edema [FROM] : FROM [Normal Color/ Pigmentation] : normal color/ pigmentation [No Focal Deficits] : no focal deficits [Oriented x3] : oriented x3 [Normal Affect] : normal affect [TextBox_11] : dry nasal mucosa, cobblestone o/p [TextBox_68] : slight inspiratory squeak L anterior, good air entry [TextBox_89] : + ventral hernia

## 2022-12-23 NOTE — REVIEW OF SYSTEMS
[Sinus Problems] : sinus problems [Chest Tightness] : chest tightness [Wheezing] : wheezing [A.M. Dry Mouth] : a.m. dry mouth [SOB on Exertion] : sob on exertion [Seasonal Allergies] : seasonal allergies [Negative] : Endocrine [Cough] : no cough [TextBox_30] : covid in oct 15 2022

## 2022-12-23 NOTE — HISTORY OF PRESENT ILLNESS
[Current] : current [Never] : never [Awakes Unrefreshed] : awakes unrefreshed [Awakes with Dry Mouth] : awakes with dry mouth [Awakes with Headache] : awakes with headache [Daytime Somnolence] : daytime somnolence [Difficulty Initiating Sleep] : difficulty initiating sleep [Difficulty Maintaining Sleep] : difficulty maintaining sleep [Fatigue] : fatigue [Frequent Nocturnal Awakening] : frequent nocturnal awakening [Hypersomnolence] : hypersomnolence [Nonrestorative Sleep] : nonrestorative sleep [Paresthesias] : paresthesias [Sleep Paralysis] : sleep paralysis [Snoring] : snoring [Tired while Driving] : tired while driving [Unintentional Sleep while Active] : unintentional sleep while active [Unintentional Sleep while Inactive] : unintentional sleep while inactive [Unusual Movements] : unusual movements [Unusual Sleep Behavior] : unusual sleep behavior [Vivid dreams] : vivid dreams [Lower Extremity Discomfort] : lower extremity discomfort [Irresistible urge to move legs] : irresistible urge to move legs [LE discomfort relieved by movement] : lower extremity discomfort relieved by movement [Late day/ Evening symptoms] : late day/ evening symptoms [Sleep Disturbances due to LE symptoms] : sleep disturbances due to lower extremity symptoms [Severe] : severe [TextBox_4] : Patient returns on a f/u of her COPD. As of yesterday, she is now 2 months free of smoking cigarettes. She reports having COVID-19 for the first time back in October. She had a bad cough during the infection but recovered fully. She has been taking montelukast QHS and thinks it helped her a lot w/ clearing out her lungs. She is now c/o wheeze that developed over the past couple days. She also c/o chest tightness/ heaviness and fatigue. She denies coughing up any phlegm and has not been waking up at night w/ cough or SOB. She still snores at night and feels tired in the morning even after logging 8 hours of sleep. Her leg cramps have diminished after she started taking hot baths and CALM supplements. She wakes up w/ AM xerostomia and has not been using nasal saline lately. She denies nasal congestion or post- nasal drip. She is having a hernia repair on Monday (11/21) w/ Dr. Castrejon.\par  [TextBox_11] : 1 [Cataplexy] : denies cataplexy [Dysesthesias] : denies dysesthesias [Hypnagogic Hallucinations] : denies hypnagogic hallucinations [Hypnopompic Hallucinations] : denies hypnopompic hallucinations [Recent  Weight Gain] : no recent weight gain [Witnessed Apneas] : no witnessed apneas [TextBox_77] : 10-11 [TextBox_79] : 8 [TextBox_81] : 80-30 [TextBox_83] : yes [TextBox_85] : 8 [TextBox_87] : 2 hours [TextBox_89] : 3-4 [ESS] : 17 [TextBox_5] : 6 [TextBox_7] : 36 [TextBox_9] : 0

## 2022-12-23 NOTE — DISCUSSION/SUMMARY
[FreeTextEntry1] : [place] 11/16/2022: FEV1 2.44 (95% pred), FEV1/FVC 79%\par [place] 9/14/2022: FEV1 2.39 (94% pred), FEV1/FVC 82%\par [facility] 8/26/2022: AHI=1, lowest desaturation 90%\par [place] 7/13/2022 FEV1 2.49 (98% predicted) FEV1/FVC- 81\par [facility] 5/22/2022: AHI=4, lowest saturation 86%\par 6[facility] 5/18/2022: No sig desat but + increased HR response.\par PFTs 05/18/2022 Actual FEV1 2.29 (FEV1 Pred 89%), FEV1/FVC(%) - 84, RV 85%, TLC 89%, RV/TLC 96%, DLCO 72% \par [place] 5/13/2022: FEV1 2.53 (99% pred), FEV1/FVC 83 \par Labs 5/13/2022: Wbc 10.47, Hgb 14.2, Hct 45.7, Plt 334, E 1.9%, Glu 102 otherwise Cmp WNL, Mg 2.1, Fe labs WNL, Vit D 30.2, German 235, Crp <3, Esr 14\par Allergy panel negative.\par CXR 5/13/2022: Mild hyperinflation otherwise NAD.\par CT Abd 4/22/2021: Thickened GE junction. Mild emphysematous pattern seen at the base. \par

## 2023-01-13 ENCOUNTER — APPOINTMENT (OUTPATIENT)
Dept: THORACIC SURGERY | Facility: CLINIC | Age: 53
End: 2023-01-13
Payer: COMMERCIAL

## 2023-01-13 VITALS — HEIGHT: 62 IN | WEIGHT: 140 LBS | BODY MASS INDEX: 25.76 KG/M2

## 2023-01-13 DIAGNOSIS — Z87.891 PERSONAL HISTORY OF NICOTINE DEPENDENCE: ICD-10-CM

## 2023-01-13 PROCEDURE — G0296 VISIT TO DETERM LDCT ELIG: CPT

## 2023-01-13 NOTE — HISTORY OF PRESENT ILLNESS
[Former] : Former [TextBox_13] : Referred by Dr. Lucille Portillo\par \par PRATIK ASHER had telephonic visit for a review of eligibility and discussion of the Low dose CT lung cancer screening program. The following was reviewed and confirmed the patient meets screening eligibility criteria.\par -Age 52 year\par Smoking Status:\par -Former smoker\par -Number of pack(s) per day: 1 PPD\par -Number of years smoked: 32\par -Number of pack years smokin\par -Quit year: 2022\par \par Ms. ASHER reports ongoing non productive cough. She denies any signs or symptoms of lung cancer including new cough, changing cough, hemoptysis, and unintentional weight loss. \par \par Ms. ASHER reports  asthma with COPD, Covid infection 10/2022 treated with Paxlovid. She  denies any personal history of lung cancer. Reports no lung cancer in a 1st degree relative. Reports no lung cancer in a 2nd degree relative. Denies any  history of  occupational exposures. Had exposure to 2nd hand smoke.\par  [YearQuit] : 2022 [PacksperYear] : 32

## 2023-01-13 NOTE — ASSESSMENT
[Maintenance] : Maintenance: The patient has quit for more than 6 months [de-identified] : Reports she occasionally has urges to smoke but verbalizes the need to stay quit. Pt agrees to contact writer at any point she feels it is necessary to discuss smoking cessation support.

## 2023-01-13 NOTE — PLAN
[Smoking Cessation] : smoking cessation [FreeTextEntry1] : Plan:\par \par -Low dose CT chest for lung cancer screening. SUSAN HARJEET ordered [place] [address], DX code Z12.2 .      \par \par -Follow up with patient and her referring provider after her LDCT results have been reviewed by the multidisciplinary clinical team, if needed.\par \par -Encourage continued smoking abstinence.\par \par Patient declines referral to CTC and CCX\par \par Should I screen? tool utilized. 6 Year risk of lung cancer is  0.4 %. \par \par Patient wishes to proceed with screening.\par \par Engaged in discussion regarding risks of screening during Covid-19 pandemic and precautions that are being used  to reduce exposure.\par \par Engaged in shared decision making with Ms. MCKEONANO . Answered all questions. She verbalized understanding and agreement. She knows to call back with and questions or concerns.\par

## 2023-02-02 ENCOUNTER — NON-APPOINTMENT (OUTPATIENT)
Age: 53
End: 2023-02-02

## 2023-02-03 ENCOUNTER — NON-APPOINTMENT (OUTPATIENT)
Age: 53
End: 2023-02-03

## 2024-05-14 ENCOUNTER — APPOINTMENT (OUTPATIENT)
Dept: THORACIC SURGERY | Facility: CLINIC | Age: 54
End: 2024-05-14
Payer: COMMERCIAL

## 2024-05-14 VITALS — BODY MASS INDEX: 27.6 KG/M2 | WEIGHT: 150 LBS | HEIGHT: 62 IN

## 2024-05-14 DIAGNOSIS — Z87.891 PERSONAL HISTORY OF NICOTINE DEPENDENCE: ICD-10-CM

## 2024-05-14 PROCEDURE — G0296 VISIT TO DETERM LDCT ELIG: CPT

## 2024-05-14 NOTE — HISTORY OF PRESENT ILLNESS
[Former] : Former [TextBox_13] : Responded to reminder PCP Dr. Lucille Portillo  PRATIK ASHER had telephonic visit for a review of eligibility and discussion of the Low dose CT lung cancer screening program. The following was reviewed and confirmed the patient meets screening eligibility criteria.  Smoking Status: -Former smoker -Number of pack(s) per day: 1 PPD -Number of years smoked: 32 -Number of pack years smokin Years since quit: 2 -Quit year:   Ms. ASHER states she is snoring and has not seen pulmonology since Dr. Joshua left. She denies any signs or symptoms of lung cancer including new cough, changing cough, hemoptysis, and unintentional weight loss.  Ms. ASHER reports asthma/COPD, Covid infection 10/2022 treated with Paxlovid. She denies DX of heart disease, connective tissue disease, and any personal history of cancer.  Reports no lung cancer in a 1st degree relative. Reports no lung cancer in a 2nd degree relative. Denies any history of occupational exposures. Had exposure to 2nd hand smoke.  [YearQuit] : 2022 [PacksperYear] : 32

## 2024-05-14 NOTE — ASSESSMENT
Drink plenty of fluids and rest  Take antibiotics as directed, complete all antibiotics as prescribed.  Tylenol/Ibuprofen for fever or pain.  Cepacol throat lozenge, warm tea and salt water gargles can also help with throat pain until antibiotics start working.   You are contagious for 24 hours after starting your antibiotic, do not share utensils, cups or kiss anyone as you may infect them with strep.  Wash hands frequently  You need to change out your toothbrush in 3 days.   Follow up with your PCP in one week if not improving  Seek care immediately if you develop difficulty swallowing, nausea, vomiting, abdominal pain, severe headache lethargy, neck stiffness, rash or for other concerns    Patient Education     Pharyngitis: Strep (Confirmed)    You have had a positive test for strep throat. Strep throat is a contagious illness. It is spread by coughing, kissing or by touching others after touching your mouth or nose. Symptoms include throat pain that is worse with swallowing, aching all over, headache, and fever. It is treated with antibiotic medicine. This should help you start to feel better in 1 to 2 days.  Home care  · Rest at home. Drink plenty of fluids to you won't get dehydrated.  · No work or school for the first 2 days of taking the antibiotics. After this time, you will not be contagious. You can then return to school or work if you are feeling better.   · Take antibiotic medicine for the full 10 days, even if you feel better. This is very important to ensure the infection is treated. It is also important to prevent medicine-resistant germs from developing. If you were given an antibiotic shot, you don't need any more antibiotics.  · You may use acetaminophen or ibuprofen to control pain or fever, unless another medicine was prescribed for this. Talk with your healthcare provider before taking these medicines if you have chronic liver or kidney disease. Also talk with your healthcare provider if you  [Maintenance] : Maintenance: The patient has quit for more than 6 months [de-identified] : Acknowledged how difficult it was to quit smoking. Advised that quitting smoking is the most important thing a person can do for their health. Reports confidence in maintaining smoking abstinence. She agrees to call writer at any point in the future he wishes to discuss smoking cessation programs with [facility]. have had a stomach ulcer or GI bleeding.  · Throat lozenges or sprays help reduce pain. Gargling with warm saltwater will also reduce throat pain. Dissolve 1/2 teaspoon of salt in 1 glass of warm water. This may be useful just before meals.   · Soft foods are OK. Don't eat salty or spicy foods.  Follow-up care  Follow up with your healthcare provider or our staff if you don't get better over the next week.  When to seek medical advice  Call your healthcare provider right away if any of these occur:  · Fever of 100.4ºF (38ºC) or higher, or as directed by your healthcare provider  · New or worsening ear pain, sinus pain, or headache  · Painful lumps in the back of neck  · Stiff neck  · Lymph nodes getting larger or becoming soft in the middle  · You can't swallow liquids or you can't open your mouth wide because of throat pain  · Signs of dehydration. These include very dark urine or no urine, sunken eyes, and dizziness.  · Trouble breathing or noisy breathing  · Muffled voice  · Rash  Prevention  Here are steps you can take to help prevent an infection:  · Keep good hand washing habits.  · Don’t have close contact with people who have sore throats, colds, or other upper respiratory infections.  · Don’t smoke, and stay away from secondhand smoke.  Date Last Reviewed: 11/1/2017  © 4429-7066 [employer]. [address] [place] [address], [place] [address]. All rights reserved. This information is not intended as a substitute for professional medical care. Always follow your healthcare professional's instructions.

## 2024-05-14 NOTE — DATA REVIEWED
[Lung Cancer Screening] : Patient underwent lung cancer screening [1] : 1 [TextBox_12] : 02/23 [TextBox_52] : 1

## 2024-05-14 NOTE — PLAN
[Smoking Cessation] : smoking cessation [FreeTextEntry1] : Plan:  -Low dose CT chest for lung cancer screening. SUSAN COSBY ordered the low dose CT.     Pt will consider seeing new pulmonologist.     -Follow up with her referring provider after her LDCT results have been reviewed by the multidisciplinary clinical team, if needed.   -Encourage continued smoking abstinence   Should I screen? tool utilized. 6 Year risk of lung cancer is  0.5%.   Patient wishes to proceed with screening.  Engaged in discussion regarding risks of screening during Covid-19 pandemic and precautions that are being used  to reduce exposure.  Engaged in shared decision making with Ms. MCKEONANO . Answered all questions. She verbalized understanding and agreement. She knows to call back with and questions or concerns.

## 2024-07-25 ENCOUNTER — NON-APPOINTMENT (OUTPATIENT)
Age: 54
End: 2024-07-25

## 2024-07-26 ENCOUNTER — APPOINTMENT (OUTPATIENT)
Dept: GASTROENTEROLOGY | Facility: CLINIC | Age: 54
End: 2024-07-26
Payer: COMMERCIAL

## 2024-07-26 VITALS
SYSTOLIC BLOOD PRESSURE: 122 MMHG | DIASTOLIC BLOOD PRESSURE: 80 MMHG | HEIGHT: 62 IN | WEIGHT: 150 LBS | HEART RATE: 76 BPM | BODY MASS INDEX: 27.6 KG/M2 | OXYGEN SATURATION: 98 %

## 2024-07-26 DIAGNOSIS — Z86.010 PERSONAL HISTORY OF COLONIC POLYPS: ICD-10-CM

## 2024-07-26 DIAGNOSIS — Z12.11 ENCOUNTER FOR SCREENING FOR MALIGNANT NEOPLASM OF COLON: ICD-10-CM

## 2024-07-26 DIAGNOSIS — K21.9 GASTRO-ESOPHAGEAL REFLUX DISEASE W/OUT ESOPHAGITIS: ICD-10-CM

## 2024-07-26 DIAGNOSIS — E66.3 OVERWEIGHT: ICD-10-CM

## 2024-07-26 PROCEDURE — S0285 CNSLT BEFORE SCREEN COLONOSC: CPT

## 2024-07-26 RX ORDER — SEMAGLUTIDE 0.25 MG/.5ML
0.25 INJECTION, SOLUTION SUBCUTANEOUS
Refills: 0 | Status: ACTIVE | COMMUNITY

## 2024-07-26 RX ORDER — PANTOPRAZOLE SODIUM 20 MG/1
TABLET, DELAYED RELEASE ORAL
Refills: 0 | Status: ACTIVE | COMMUNITY

## 2024-08-18 ENCOUNTER — RESULT REVIEW (OUTPATIENT)
Age: 54
End: 2024-08-18

## 2024-08-19 ENCOUNTER — APPOINTMENT (OUTPATIENT)
Dept: GASTROENTEROLOGY | Facility: HOSPITAL | Age: 54
End: 2024-08-19

## 2024-08-19 ENCOUNTER — RESULT REVIEW (OUTPATIENT)
Age: 54
End: 2024-08-19

## 2025-08-06 ENCOUNTER — APPOINTMENT (OUTPATIENT)
Dept: PULMONOLOGY | Facility: CLINIC | Age: 55
End: 2025-08-06
Payer: COMMERCIAL

## 2025-08-06 VITALS — BODY MASS INDEX: 27.6 KG/M2 | WEIGHT: 150 LBS | HEIGHT: 62 IN

## 2025-08-06 DIAGNOSIS — Z87.891 PERSONAL HISTORY OF NICOTINE DEPENDENCE: ICD-10-CM

## 2025-08-06 PROCEDURE — G0296 VISIT TO DETERM LDCT ELIG: CPT

## 2025-09-17 ENCOUNTER — NON-APPOINTMENT (OUTPATIENT)
Age: 55
End: 2025-09-17